# Patient Record
(demographics unavailable — no encounter records)

---

## 2019-11-12 NOTE — ECGEPIP
[facility] - ED

                                       

                                       Test Date:    2019

Pat Name:     MANOHAR FRY   Department:   

Patient ID:   [id number]                 Room:         -

Gender:       Female                   Technician:   casper

:          1983               Requested By: FERNANDO KING PA-C

Order Number: [id number]     Reading MD:   Jese Boone

                                 Measurements

Intervals                              Axis          

Rate:         75                       P:            3

WY:           155                      QRS:          48

QRSD:         90                       T:            30

QT:           360                                    

QTc:          403                                    

                           Interpretive Statements

SINUS RHYTHM

NO PRIORS FOR COMPARISON

Electronically Signed on 2019 23:23:11 EST by Jese Boone

## 2019-11-12 NOTE — REP
Two-view chest:  11/12/2019.

 

Indication:  Cough.  Chest pain.

 

Comparison:  None.

 

Findings:

 

The lungs are clear.  There is no pleural effusion or pneumothorax.  The

cardiomediastinal silhouette is unremarkable.

 

Impression:

 

There is no acute cardiopulmonary process.

 

 

Electronically Signed by

Faisal Campos DO 11/12/2019 01:34 P

## 2019-12-20 NOTE — REP
Left wrist series:  Four views.

 

History:  Injury.

 

Findings:  Four views of the left wrist demonstrate normal bones, joints, and

soft tissues.  No fracture or subluxation is seen.

 

Impression:

 

Negative left wrist radiographs.

 

 

Electronically Signed by

Nils Morrow MD 12/19/2019 05:53 P